# Patient Record
Sex: FEMALE | NOT HISPANIC OR LATINO | Employment: OTHER | URBAN - METROPOLITAN AREA
[De-identification: names, ages, dates, MRNs, and addresses within clinical notes are randomized per-mention and may not be internally consistent; named-entity substitution may affect disease eponyms.]

---

## 2024-06-15 ENCOUNTER — HOSPITAL ENCOUNTER (INPATIENT)
Facility: HOSPITAL | Age: 39
DRG: 644 | End: 2024-06-15
Attending: GENERAL PRACTICE | Admitting: INTERNAL MEDICINE
Payer: COMMERCIAL

## 2024-06-15 DIAGNOSIS — E87.1 HYPONATREMIA: Primary | ICD-10-CM

## 2024-06-15 LAB
ALBUMIN SERPL BCP-MCNC: 4.8 G/DL (ref 3.4–5)
ALP SERPL-CCNC: 85 U/L (ref 33–110)
ALT SERPL W P-5'-P-CCNC: 60 U/L (ref 7–45)
ANION GAP SERPL CALC-SCNC: 14 MMOL/L (ref 10–20)
APPEARANCE UR: CLEAR
AST SERPL W P-5'-P-CCNC: 56 U/L (ref 9–39)
B-HCG SERPL-ACNC: <2 MIU/ML
BASOPHILS # BLD AUTO: 0.04 X10*3/UL (ref 0–0.1)
BASOPHILS NFR BLD AUTO: 0.6 %
BILIRUB SERPL-MCNC: 1 MG/DL (ref 0–1.2)
BILIRUB UR STRIP.AUTO-MCNC: NEGATIVE MG/DL
BUN SERPL-MCNC: 8 MG/DL (ref 6–23)
CALCIUM SERPL-MCNC: 9.5 MG/DL (ref 8.6–10.3)
CHLORIDE SERPL-SCNC: 89 MMOL/L (ref 98–107)
CO2 SERPL-SCNC: 23 MMOL/L (ref 21–32)
COLOR UR: COLORLESS
CREAT SERPL-MCNC: 0.66 MG/DL (ref 0.5–1.05)
EGFRCR SERPLBLD CKD-EPI 2021: >90 ML/MIN/1.73M*2
EOSINOPHIL # BLD AUTO: 0.13 X10*3/UL (ref 0–0.7)
EOSINOPHIL NFR BLD AUTO: 1.8 %
ERYTHROCYTE [DISTWIDTH] IN BLOOD BY AUTOMATED COUNT: 11.7 % (ref 11.5–14.5)
FLUAV RNA RESP QL NAA+PROBE: NOT DETECTED
FLUBV RNA RESP QL NAA+PROBE: NOT DETECTED
GLUCOSE SERPL-MCNC: 99 MG/DL (ref 74–99)
GLUCOSE UR STRIP.AUTO-MCNC: NORMAL MG/DL
HCT VFR BLD AUTO: 39.5 % (ref 36–46)
HGB BLD-MCNC: 13.9 G/DL (ref 12–16)
IMM GRANULOCYTES # BLD AUTO: 0.03 X10*3/UL (ref 0–0.7)
IMM GRANULOCYTES NFR BLD AUTO: 0.4 % (ref 0–0.9)
KETONES UR STRIP.AUTO-MCNC: NEGATIVE MG/DL
LEUKOCYTE ESTERASE UR QL STRIP.AUTO: NEGATIVE
LYMPHOCYTES # BLD AUTO: 1.66 X10*3/UL (ref 1.2–4.8)
LYMPHOCYTES NFR BLD AUTO: 23.2 %
MCH RBC QN AUTO: 28 PG (ref 26–34)
MCHC RBC AUTO-ENTMCNC: 35.2 G/DL (ref 32–36)
MCV RBC AUTO: 80 FL (ref 80–100)
MONOCYTES # BLD AUTO: 0.46 X10*3/UL (ref 0.1–1)
MONOCYTES NFR BLD AUTO: 6.4 %
NEUTROPHILS # BLD AUTO: 4.84 X10*3/UL (ref 1.2–7.7)
NEUTROPHILS NFR BLD AUTO: 67.6 %
NITRITE UR QL STRIP.AUTO: NEGATIVE
NRBC BLD-RTO: 0 /100 WBCS (ref 0–0)
PH UR STRIP.AUTO: 6.5 [PH]
PLATELET # BLD AUTO: 348 X10*3/UL (ref 150–450)
POTASSIUM SERPL-SCNC: 4 MMOL/L (ref 3.5–5.3)
PROT SERPL-MCNC: 7.7 G/DL (ref 6.4–8.2)
PROT UR STRIP.AUTO-MCNC: NEGATIVE MG/DL
RBC # BLD AUTO: 4.96 X10*6/UL (ref 4–5.2)
RBC # UR STRIP.AUTO: NEGATIVE /UL
SARS-COV-2 RNA RESP QL NAA+PROBE: NOT DETECTED
SODIUM SERPL-SCNC: 122 MMOL/L (ref 136–145)
SP GR UR STRIP.AUTO: 1
UROBILINOGEN UR STRIP.AUTO-MCNC: NORMAL MG/DL
WBC # BLD AUTO: 7.2 X10*3/UL (ref 4.4–11.3)

## 2024-06-15 PROCEDURE — 80053 COMPREHEN METABOLIC PANEL: CPT | Performed by: GENERAL PRACTICE

## 2024-06-15 PROCEDURE — 2060000001 HC INTERMEDIATE ICU ROOM DAILY

## 2024-06-15 PROCEDURE — 36415 COLL VENOUS BLD VENIPUNCTURE: CPT | Performed by: GENERAL PRACTICE

## 2024-06-15 PROCEDURE — 84702 CHORIONIC GONADOTROPIN TEST: CPT | Performed by: GENERAL PRACTICE

## 2024-06-15 PROCEDURE — 85025 COMPLETE CBC W/AUTO DIFF WBC: CPT | Performed by: GENERAL PRACTICE

## 2024-06-15 PROCEDURE — 87636 SARSCOV2 & INF A&B AMP PRB: CPT | Performed by: GENERAL PRACTICE

## 2024-06-15 PROCEDURE — 2500000002 HC RX 250 W HCPCS SELF ADMINISTERED DRUGS (ALT 637 FOR MEDICARE OP, ALT 636 FOR OP/ED): Performed by: INTERNAL MEDICINE

## 2024-06-15 PROCEDURE — 2500000004 HC RX 250 GENERAL PHARMACY W/ HCPCS (ALT 636 FOR OP/ED): Performed by: GENERAL PRACTICE

## 2024-06-15 PROCEDURE — 96374 THER/PROPH/DIAG INJ IV PUSH: CPT

## 2024-06-15 PROCEDURE — 96361 HYDRATE IV INFUSION ADD-ON: CPT

## 2024-06-15 PROCEDURE — 99285 EMERGENCY DEPT VISIT HI MDM: CPT | Mod: 25

## 2024-06-15 PROCEDURE — 81003 URINALYSIS AUTO W/O SCOPE: CPT | Performed by: GENERAL PRACTICE

## 2024-06-15 PROCEDURE — 2500000004 HC RX 250 GENERAL PHARMACY W/ HCPCS (ALT 636 FOR OP/ED): Performed by: INTERNAL MEDICINE

## 2024-06-15 RX ORDER — PROCHLORPERAZINE 25 MG/1
25 SUPPOSITORY RECTAL EVERY 12 HOURS PRN
Status: DISCONTINUED | OUTPATIENT
Start: 2024-06-15 | End: 2024-06-17 | Stop reason: HOSPADM

## 2024-06-15 RX ORDER — DESMOPRESSIN ACETATE 0.1 MG/ML
10 SOLUTION NASAL 2 TIMES DAILY
Status: DISCONTINUED | OUTPATIENT
Start: 2024-06-15 | End: 2024-06-15

## 2024-06-15 RX ORDER — LEVOTHYROXINE SODIUM 75 UG/1
150 TABLET ORAL DAILY
Status: DISCONTINUED | OUTPATIENT
Start: 2024-06-16 | End: 2024-06-17 | Stop reason: HOSPADM

## 2024-06-15 RX ORDER — POLYETHYLENE GLYCOL 3350 17 G/17G
17 POWDER, FOR SOLUTION ORAL DAILY
Status: DISCONTINUED | OUTPATIENT
Start: 2024-06-15 | End: 2024-06-17 | Stop reason: HOSPADM

## 2024-06-15 RX ORDER — HYDROCORTISONE 5 MG/1
10 TABLET ORAL EVERY 12 HOURS SCHEDULED
Status: DISCONTINUED | OUTPATIENT
Start: 2024-06-15 | End: 2024-06-16

## 2024-06-15 RX ORDER — ONDANSETRON HYDROCHLORIDE 2 MG/ML
4 INJECTION, SOLUTION INTRAVENOUS EVERY 6 HOURS PRN
Status: DISCONTINUED | OUTPATIENT
Start: 2024-06-15 | End: 2024-06-17 | Stop reason: HOSPADM

## 2024-06-15 RX ORDER — PROCHLORPERAZINE EDISYLATE 5 MG/ML
10 INJECTION INTRAMUSCULAR; INTRAVENOUS EVERY 6 HOURS PRN
Status: DISCONTINUED | OUTPATIENT
Start: 2024-06-15 | End: 2024-06-17 | Stop reason: HOSPADM

## 2024-06-15 RX ORDER — PROCHLORPERAZINE MALEATE 10 MG
10 TABLET ORAL EVERY 6 HOURS PRN
Status: DISCONTINUED | OUTPATIENT
Start: 2024-06-15 | End: 2024-06-17 | Stop reason: HOSPADM

## 2024-06-15 RX ORDER — ONDANSETRON HYDROCHLORIDE 2 MG/ML
4 INJECTION, SOLUTION INTRAVENOUS ONCE
Status: COMPLETED | OUTPATIENT
Start: 2024-06-15 | End: 2024-06-15

## 2024-06-15 RX ORDER — SODIUM CHLORIDE 9 MG/ML
75 INJECTION, SOLUTION INTRAVENOUS CONTINUOUS
Status: DISCONTINUED | OUTPATIENT
Start: 2024-06-15 | End: 2024-06-16

## 2024-06-15 RX ADMIN — SODIUM CHLORIDE 1000 ML: 9 INJECTION, SOLUTION INTRAVENOUS at 16:05

## 2024-06-15 RX ADMIN — HYDROCORTISONE 10 MG: 5 TABLET ORAL at 20:38

## 2024-06-15 RX ADMIN — SODIUM CHLORIDE 1000 ML: 9 INJECTION, SOLUTION INTRAVENOUS at 14:24

## 2024-06-15 RX ADMIN — ONDANSETRON 4 MG: 2 INJECTION INTRAMUSCULAR; INTRAVENOUS at 14:24

## 2024-06-15 RX ADMIN — SODIUM CHLORIDE 75 ML/HR: 9 INJECTION, SOLUTION INTRAVENOUS at 20:37

## 2024-06-15 SDOH — SOCIAL STABILITY: SOCIAL INSECURITY: DO YOU FEEL ANYONE HAS EXPLOITED OR TAKEN ADVANTAGE OF YOU FINANCIALLY OR OF YOUR PERSONAL PROPERTY?: NO

## 2024-06-15 SDOH — SOCIAL STABILITY: SOCIAL INSECURITY: ABUSE: ADULT

## 2024-06-15 SDOH — SOCIAL STABILITY: SOCIAL INSECURITY: ARE THERE ANY APPARENT SIGNS OF INJURIES/BEHAVIORS THAT COULD BE RELATED TO ABUSE/NEGLECT?: NO

## 2024-06-15 SDOH — SOCIAL STABILITY: SOCIAL INSECURITY: HAS ANYONE EVER THREATENED TO HURT YOUR FAMILY OR YOUR PETS?: NO

## 2024-06-15 SDOH — SOCIAL STABILITY: SOCIAL INSECURITY: HAVE YOU HAD THOUGHTS OF HARMING ANYONE ELSE?: NO

## 2024-06-15 SDOH — SOCIAL STABILITY: SOCIAL INSECURITY: WERE YOU ABLE TO COMPLETE ALL THE BEHAVIORAL HEALTH SCREENINGS?: YES

## 2024-06-15 SDOH — SOCIAL STABILITY: SOCIAL INSECURITY: HAVE YOU HAD ANY THOUGHTS OF HARMING ANYONE ELSE?: NO

## 2024-06-15 SDOH — SOCIAL STABILITY: SOCIAL INSECURITY: DO YOU FEEL UNSAFE GOING BACK TO THE PLACE WHERE YOU ARE LIVING?: NO

## 2024-06-15 SDOH — SOCIAL STABILITY: SOCIAL INSECURITY: ARE YOU OR HAVE YOU BEEN THREATENED OR ABUSED PHYSICALLY, EMOTIONALLY, OR SEXUALLY BY ANYONE?: NO

## 2024-06-15 SDOH — SOCIAL STABILITY: SOCIAL INSECURITY: DOES ANYONE TRY TO KEEP YOU FROM HAVING/CONTACTING OTHER FRIENDS OR DOING THINGS OUTSIDE YOUR HOME?: NO

## 2024-06-15 ASSESSMENT — COLUMBIA-SUICIDE SEVERITY RATING SCALE - C-SSRS
1. IN THE PAST MONTH, HAVE YOU WISHED YOU WERE DEAD OR WISHED YOU COULD GO TO SLEEP AND NOT WAKE UP?: NO
6. HAVE YOU EVER DONE ANYTHING, STARTED TO DO ANYTHING, OR PREPARED TO DO ANYTHING TO END YOUR LIFE?: NO
2. HAVE YOU ACTUALLY HAD ANY THOUGHTS OF KILLING YOURSELF?: NO

## 2024-06-15 ASSESSMENT — LIFESTYLE VARIABLES
HOW OFTEN DO YOU HAVE A DRINK CONTAINING ALCOHOL: NEVER
AUDIT-C TOTAL SCORE: 0
SUBSTANCE_ABUSE_PAST_12_MONTHS: NO
HOW OFTEN DO YOU HAVE 6 OR MORE DRINKS ON ONE OCCASION: NEVER
PRESCIPTION_ABUSE_PAST_12_MONTHS: NO
HOW MANY STANDARD DRINKS CONTAINING ALCOHOL DO YOU HAVE ON A TYPICAL DAY: PATIENT DOES NOT DRINK
SKIP TO QUESTIONS 9-10: 1
AUDIT-C TOTAL SCORE: 0

## 2024-06-15 ASSESSMENT — COGNITIVE AND FUNCTIONAL STATUS - GENERAL
PATIENT BASELINE BEDBOUND: NO
MOBILITY SCORE: 24
DAILY ACTIVITIY SCORE: 24

## 2024-06-15 ASSESSMENT — ACTIVITIES OF DAILY LIVING (ADL)
ADEQUATE_TO_COMPLETE_ADL: YES
HEARING - LEFT EAR: FUNCTIONAL
PATIENT'S MEMORY ADEQUATE TO SAFELY COMPLETE DAILY ACTIVITIES?: YES
BATHING: INDEPENDENT
WALKS IN HOME: INDEPENDENT
FEEDING YOURSELF: INDEPENDENT
DRESSING YOURSELF: INDEPENDENT
TOILETING: INDEPENDENT
GROOMING: INDEPENDENT
JUDGMENT_ADEQUATE_SAFELY_COMPLETE_DAILY_ACTIVITIES: YES
HEARING - RIGHT EAR: FUNCTIONAL

## 2024-06-15 ASSESSMENT — PATIENT HEALTH QUESTIONNAIRE - PHQ9
2. FEELING DOWN, DEPRESSED OR HOPELESS: NOT AT ALL
SUM OF ALL RESPONSES TO PHQ9 QUESTIONS 1 & 2: 0
1. LITTLE INTEREST OR PLEASURE IN DOING THINGS: NOT AT ALL

## 2024-06-15 ASSESSMENT — PAIN SCALES - GENERAL
PAINLEVEL_OUTOF10: 0 - NO PAIN

## 2024-06-15 ASSESSMENT — PAIN - FUNCTIONAL ASSESSMENT
PAIN_FUNCTIONAL_ASSESSMENT: 0-10

## 2024-06-15 NOTE — ED TRIAGE NOTES
PT PRESENTS TO THE ED FOR VOMITING AND STATES THAT SHE HAS ADRENAL INSUFFIENCEY. PT ENDORSES THAT USUALLY WHEN THIS HAPPENS SHE GETS ADMITTED FOR HYPONATREMIA. PT DENIES FEVER OR CHILLS. PT DENIES CHEST OR SOB.

## 2024-06-15 NOTE — ED PROVIDER NOTES
HPI   Chief Complaint   Patient presents with    Vomiting    Nausea       HPI: 39-year-old female with a history of adrenal insufficiency and diabetes insipidus presents for nausea and vomiting.  Last evening she developed nausea and vomiting which continued through the night.  She is concerned that she may be hyponatremic.  She denies fever, chills, chest pain, abdominal pain, cough and shortness of breath.  No sick contacts or recent travel      Limitations to history: None  Independent Historians: Patient  External Records Reviewed: HIE, outpatient notes, inpatient notes  ------------------------------------------------------------------------------------------------------------------------------------------  ROS: a ten point review of systems was performed and was negative except as per HPI.  ------------------------------------------------------------------------------------------------------------------------------------------  PMH / PSH: as per HPI, otherwise reviewed in EMR  MEDS: as per HPI, otherwise reviewed in EMR  ALLERGIES: as per HPI, otherwise reviewed in EMR  SocH:  as per HPI, otherwise reviewed in EMR  FH:  as per HPI, otherwise reviewed in EMR  ------------------------------------------------------------------------------------------------------------------------------------------  Physical Exam:  VS: As documented in the triage note and EMR flowsheet from this visit was reviewed  General: Well appearing. No acute distress.   Eyes:  Extraocular movements grossly intact. No scleral icterus. No discharge  HEENT:  Normocephalic.  Atraumatic  Neck: Moves neck freely. No gross masses  CV: Regular rhythm. No murmurs, rubs or gallops   Resp: Clear to auscultation bilaterally. No respiratory distress.    GI: Soft, no masses, nontender. No rebound tenderness or guarding  MSK: Symmetric muscle bulk. No deformities. No lower extremity edema.    Skin: Warm, dry, intact.   Neuro: No focal deficits.  A&O x3.    Psych: Appropriate for situation  ------------------------------------------------------------------------------------------------------------------------------------------  Hospital Course / Medical Decision Making:  Independent Interpretations: N/A  EKG as interpreted by me: N/A    MDM: 39-year-old female with a history of adrenal insufficiency and diabetes insipidus presents for nausea and vomiting.  She was given Zofran.  Sodium found to be 122.  She is neurologically intact.  She was started on an infusion of sodium chloride and was admitted to the medicine service for further evaluation on ICU stepdown.    Discussion of Management with Other Providers:   I discussed the patient/results with: Emergency medicine team    Final diagnosis and disposition as below.    Results for orders placed or performed during the hospital encounter of 06/15/24  -CBC and Auto Differential:        Result                      Value             Ref Range           WBC                         7.2               4.4 - 11.3 x*       nRBC                        0.0               0.0 - 0.0 /1*       RBC                         4.96              4.00 - 5.20 *       Hemoglobin                  13.9              12.0 - 16.0 *       Hematocrit                  39.5              36.0 - 46.0 %       MCV                         80                80 - 100 fL         MCH                         28.0              26.0 - 34.0 *       MCHC                        35.2              32.0 - 36.0 *       RDW                         11.7              11.5 - 14.5 %       Platelets                   348               150 - 450 x1*       Neutrophils %               67.6              40.0 - 80.0 %       Immature Granulocytes *     0.4               0.0 - 0.9 %         Lymphocytes %               23.2              13.0 - 44.0 %       Monocytes %                 6.4               2.0 - 10.0 %        Eosinophils %               1.8               0.0 - 6.0 %          Basophils %                 0.6               0.0 - 2.0 %         Neutrophils Absolute        4.84              1.20 - 7.70 *       Immature Granulocytes *     0.03              0.00 - 0.70 *       Lymphocytes Absolute        1.66              1.20 - 4.80 *       Monocytes Absolute          0.46              0.10 - 1.00 *       Eosinophils Absolute        0.13              0.00 - 0.70 *       Basophils Absolute          0.04              0.00 - 0.10 *  -Sars-CoV-2 PCR:        Result                      Value             Ref Range           Coronavirus 2019, PCR       Not Detected      Not Detected   -Influenza A, and B PCR:        Result                      Value             Ref Range           Flu A Result                Not Detected      Not Detected        Flu B Result                Not Detected      Not Detected   No orders to display                            James Coma Scale Score: 15                     Patient History   No past medical history on file.  No past surgical history on file.  No family history on file.  Social History     Tobacco Use    Smoking status: Not on file    Smokeless tobacco: Not on file   Substance Use Topics    Alcohol use: Not on file    Drug use: Not on file       Physical Exam   ED Triage Vitals   Temperature Heart Rate Respirations BP   06/15/24 1328 06/15/24 1328 06/15/24 1328 06/15/24 1331   36.4 °C (97.5 °F) 86 18 (!) 144/99      Pulse Ox Temp Source Heart Rate Source Patient Position   06/15/24 1328 06/15/24 1328 -- --   98 % Temporal        BP Location FiO2 (%)     -- --             Physical Exam    ED Course & MDM   Diagnoses as of 06/16/24 2243   Hyponatremia       Medical Decision Making      Procedure  Procedures     Dave Hart, DO  06/16/24 2241

## 2024-06-16 VITALS
BODY MASS INDEX: 29.03 KG/M2 | WEIGHT: 196 LBS | TEMPERATURE: 97.1 F | DIASTOLIC BLOOD PRESSURE: 72 MMHG | OXYGEN SATURATION: 98 % | RESPIRATION RATE: 18 BRPM | HEIGHT: 69 IN | SYSTOLIC BLOOD PRESSURE: 116 MMHG | HEART RATE: 73 BPM

## 2024-06-16 LAB
ALBUMIN SERPL BCP-MCNC: 4.1 G/DL (ref 3.4–5)
ANION GAP SERPL CALC-SCNC: 10 MMOL/L (ref 10–20)
ANION GAP SERPL CALC-SCNC: 11 MMOL/L (ref 10–20)
BUN SERPL-MCNC: 10 MG/DL (ref 6–23)
BUN SERPL-MCNC: 13 MG/DL (ref 6–23)
CALCIUM SERPL-MCNC: 8.6 MG/DL (ref 8.6–10.3)
CALCIUM SERPL-MCNC: 9.3 MG/DL (ref 8.6–10.3)
CHLORIDE SERPL-SCNC: 104 MMOL/L (ref 98–107)
CHLORIDE SERPL-SCNC: 99 MMOL/L (ref 98–107)
CO2 SERPL-SCNC: 25 MMOL/L (ref 21–32)
CO2 SERPL-SCNC: 25 MMOL/L (ref 21–32)
CREAT SERPL-MCNC: 0.82 MG/DL (ref 0.5–1.05)
CREAT SERPL-MCNC: 0.85 MG/DL (ref 0.5–1.05)
CREAT UR-MCNC: 72 MG/DL (ref 20–320)
EGFRCR SERPLBLD CKD-EPI 2021: 90 ML/MIN/1.73M*2
EGFRCR SERPLBLD CKD-EPI 2021: >90 ML/MIN/1.73M*2
ERYTHROCYTE [DISTWIDTH] IN BLOOD BY AUTOMATED COUNT: 12.3 % (ref 11.5–14.5)
GLUCOSE SERPL-MCNC: 103 MG/DL (ref 74–99)
GLUCOSE SERPL-MCNC: 81 MG/DL (ref 74–99)
HCT VFR BLD AUTO: 38.7 % (ref 36–46)
HGB BLD-MCNC: 13.2 G/DL (ref 12–16)
HOLD SPECIMEN: NORMAL
MCH RBC QN AUTO: 28.1 PG (ref 26–34)
MCHC RBC AUTO-ENTMCNC: 34.1 G/DL (ref 32–36)
MCV RBC AUTO: 83 FL (ref 80–100)
NRBC BLD-RTO: 0 /100 WBCS (ref 0–0)
OSMOLALITY UR: 513 MOSM/KG (ref 200–1200)
PHOSPHATE SERPL-MCNC: 3.3 MG/DL (ref 2.5–4.9)
PLATELET # BLD AUTO: 321 X10*3/UL (ref 150–450)
POTASSIUM SERPL-SCNC: 4.2 MMOL/L (ref 3.5–5.3)
POTASSIUM SERPL-SCNC: 4.2 MMOL/L (ref 3.5–5.3)
RBC # BLD AUTO: 4.69 X10*6/UL (ref 4–5.2)
SODIUM SERPL-SCNC: 131 MMOL/L (ref 136–145)
SODIUM SERPL-SCNC: 135 MMOL/L (ref 136–145)
WBC # BLD AUTO: 7.5 X10*3/UL (ref 4.4–11.3)

## 2024-06-16 PROCEDURE — 2500000004 HC RX 250 GENERAL PHARMACY W/ HCPCS (ALT 636 FOR OP/ED): Performed by: INTERNAL MEDICINE

## 2024-06-16 PROCEDURE — 83935 ASSAY OF URINE OSMOLALITY: CPT | Mod: AHULAB | Performed by: INTERNAL MEDICINE

## 2024-06-16 PROCEDURE — 36415 COLL VENOUS BLD VENIPUNCTURE: CPT | Performed by: INTERNAL MEDICINE

## 2024-06-16 PROCEDURE — 99221 1ST HOSP IP/OBS SF/LOW 40: CPT | Performed by: INTERNAL MEDICINE

## 2024-06-16 PROCEDURE — 84100 ASSAY OF PHOSPHORUS: CPT | Performed by: INTERNAL MEDICINE

## 2024-06-16 PROCEDURE — 82570 ASSAY OF URINE CREATININE: CPT | Performed by: INTERNAL MEDICINE

## 2024-06-16 PROCEDURE — 2500000001 HC RX 250 WO HCPCS SELF ADMINISTERED DRUGS (ALT 637 FOR MEDICARE OP): Performed by: INTERNAL MEDICINE

## 2024-06-16 PROCEDURE — 2500000002 HC RX 250 W HCPCS SELF ADMINISTERED DRUGS (ALT 637 FOR MEDICARE OP, ALT 636 FOR OP/ED): Performed by: INTERNAL MEDICINE

## 2024-06-16 PROCEDURE — 85027 COMPLETE CBC AUTOMATED: CPT | Performed by: INTERNAL MEDICINE

## 2024-06-16 PROCEDURE — 2060000001 HC INTERMEDIATE ICU ROOM DAILY

## 2024-06-16 PROCEDURE — 82374 ASSAY BLOOD CARBON DIOXIDE: CPT | Performed by: INTERNAL MEDICINE

## 2024-06-16 RX ORDER — DESMOPRESSIN ACETATE 0.1 MG/ML
10 SOLUTION NASAL NIGHTLY
Status: DISCONTINUED | OUTPATIENT
Start: 2024-06-16 | End: 2024-06-17 | Stop reason: HOSPADM

## 2024-06-16 RX ORDER — ACETAMINOPHEN 650 MG/1
650 SUPPOSITORY RECTAL EVERY 4 HOURS PRN
Status: DISCONTINUED | OUTPATIENT
Start: 2024-06-16 | End: 2024-06-17 | Stop reason: HOSPADM

## 2024-06-16 RX ORDER — ACETAMINOPHEN 325 MG/1
650 TABLET ORAL EVERY 4 HOURS PRN
Status: DISCONTINUED | OUTPATIENT
Start: 2024-06-16 | End: 2024-06-17 | Stop reason: HOSPADM

## 2024-06-16 RX ORDER — HYDROCORTISONE 5 MG/1
10 TABLET ORAL EVERY 24 HOURS
Status: DISCONTINUED | OUTPATIENT
Start: 2024-06-16 | End: 2024-06-17 | Stop reason: HOSPADM

## 2024-06-16 RX ORDER — ACETAMINOPHEN 160 MG/5ML
650 SOLUTION ORAL EVERY 4 HOURS PRN
Status: DISCONTINUED | OUTPATIENT
Start: 2024-06-16 | End: 2024-06-17 | Stop reason: HOSPADM

## 2024-06-16 RX ORDER — DESMOPRESSIN ACETATE 4 UG/ML
2 INJECTION, SOLUTION INTRAVENOUS; SUBCUTANEOUS ONCE
Status: COMPLETED | OUTPATIENT
Start: 2024-06-16 | End: 2024-06-16

## 2024-06-16 RX ORDER — HYDROCORTISONE 5 MG/1
20 TABLET ORAL EVERY 24 HOURS
Status: DISCONTINUED | OUTPATIENT
Start: 2024-06-17 | End: 2024-06-17 | Stop reason: HOSPADM

## 2024-06-16 RX ORDER — DESMOPRESSIN ACETATE 0.1 MG/ML
10 SOLUTION NASAL NIGHTLY
Status: DISCONTINUED | OUTPATIENT
Start: 2024-06-16 | End: 2024-06-16 | Stop reason: CLARIF

## 2024-06-16 RX ADMIN — ACETAMINOPHEN 650 MG: 325 TABLET ORAL at 12:31

## 2024-06-16 RX ADMIN — HYDROCORTISONE 10 MG: 5 TABLET ORAL at 17:05

## 2024-06-16 RX ADMIN — LEVOTHYROXINE SODIUM 150 MCG: 75 TABLET ORAL at 05:47

## 2024-06-16 RX ADMIN — ACETAMINOPHEN 650 MG: 325 TABLET ORAL at 06:15

## 2024-06-16 RX ADMIN — SODIUM CHLORIDE 75 ML/HR: 9 INJECTION, SOLUTION INTRAVENOUS at 05:49

## 2024-06-16 RX ADMIN — ACETAMINOPHEN 650 MG: 325 TABLET ORAL at 20:58

## 2024-06-16 RX ADMIN — DESMOPRESSIN ACETATE 2 MCG: 4 SOLUTION INTRAVENOUS at 12:31

## 2024-06-16 RX ADMIN — HYDROCORTISONE 10 MG: 5 TABLET ORAL at 09:13

## 2024-06-16 ASSESSMENT — COGNITIVE AND FUNCTIONAL STATUS - GENERAL
MOBILITY SCORE: 24
DAILY ACTIVITIY SCORE: 24
MOBILITY SCORE: 24
DAILY ACTIVITIY SCORE: 24

## 2024-06-16 ASSESSMENT — PAIN SCALES - GENERAL
PAINLEVEL_OUTOF10: 3
PAINLEVEL_OUTOF10: 0 - NO PAIN
PAINLEVEL_OUTOF10: 3
PAINLEVEL_OUTOF10: 0 - NO PAIN

## 2024-06-16 ASSESSMENT — PAIN - FUNCTIONAL ASSESSMENT
PAIN_FUNCTIONAL_ASSESSMENT: 0-10

## 2024-06-16 ASSESSMENT — ENCOUNTER SYMPTOMS
DIARRHEA: 1
BACK PAIN: 0
ABDOMINAL PAIN: 0
DIAPHORESIS: 0
ENDOCRINE COMMENTS: AS ABOVE
CHEST TIGHTNESS: 0
AGITATION: 0
FATIGUE: 0
APNEA: 0
APPETITE CHANGE: 1
ABDOMINAL DISTENTION: 0
ACTIVITY CHANGE: 0
FACIAL ASYMMETRY: 0
DIFFICULTY URINATING: 0
EYE DISCHARGE: 0
NAUSEA: 1
DIZZINESS: 0
ARTHRALGIAS: 1
VOMITING: 1

## 2024-06-16 ASSESSMENT — PAIN DESCRIPTION - LOCATION: LOCATION: HEAD

## 2024-06-16 NOTE — CARE PLAN
The patient's goals for the shift include remains HDS    The clinical goals for the shift include patient will remain hemodynamically stable throughout shift

## 2024-06-16 NOTE — CONSULTS
"Inpatient consult to Endocrinology  Consult performed by: Marcel Romero MD  Consult ordered by: Vinny Pierce MD      Reason For Consult  Pituitary    History Of Present Illness  Kerri Torres is a 39 y.o. female presenting with Hyponatremia.     History of postoperative hypopituitarism since age 7.   History of craniopharyngioma.    Patient presented symptomatic of hyponatremia, which she recognized.    Hormone replacements:  Hydrocortisone 20 mg QAM, 10 mg QPM  Levothyroxine 150 mcg/day  DDAVP 10 mcg one nasal spray per night  Genotropin 0.2 mg/day  Xulane patch      Past Medical History  Craniopharyngioma  Irritable bowel syndrome    Surgical History  Excision of craniopharyngioma  Lumbar spine surgery  [place] tooth surgery  TMJ surgery     Social History  She has no history on file for tobacco use, alcohol use, and drug use.    Family History  Breast cancer     Allergies  Shellfish containing products and Metoclopramide hcl    Review of Systems   Gastrointestinal:  Positive for diarrhea (not current), nausea (not current) and vomiting (not current).   Endocrine:        As above        Physical Exam  Constitutional:       General: She is not in acute distress.  HENT:      Head: Normocephalic.   Eyes:      Extraocular Movements: Extraocular movements intact.   Cardiovascular:      Pulses:           Radial pulses are 2+ on the right side and 2+ on the left side.   Abdominal:      Tenderness: There is no abdominal tenderness.   Musculoskeletal:      Right lower leg: No edema.      Left lower leg: No edema.   Neurological:      Mental Status: She is alert.      Motor: No tremor.   Psychiatric:         Mood and Affect: Affect normal.          Last Recorded Vitals  Blood pressure 103/70, pulse 81, temperature 36.6 °C (97.8 °F), temperature source Temporal, resp. rate 18, height 1.753 m (5' 9\"), weight 88.9 kg (196 lb), last menstrual period 05/27/2024, SpO2 96%.    Relevant Results   Latest Reference " Range & Units 06/15/24 13:54 06/16/24 06:11 06/16/24 14:18   GLUCOSE 74 - 99 mg/dL 99 81 103 (H)   SODIUM 136 - 145 mmol/L 122 (L) 135 (L) 131 (L)   POTASSIUM 3.5 - 5.3 mmol/L 4.0 4.2 4.2   CHLORIDE 98 - 107 mmol/L 89 (L) 104 99   Bicarbonate 21 - 32 mmol/L 23 25 25   Anion Gap 10 - 20 mmol/L 14 10 11   Blood Urea Nitrogen 6 - 23 mg/dL 8 10 13   Creatinine 0.50 - 1.05 mg/dL 0.66 0.82 0.85   EGFR >60 mL/min/1.73m*2 >90 >90 90   Calcium 8.6 - 10.3 mg/dL 9.5 9.3 8.6   PHOSPHORUS 2.5 - 4.9 mg/dL   3.3   Albumin 3.4 - 5.0 g/dL 4.8  4.1       Assessment/Plan   Principal Problem:    Hyponatremia      HYPOPITUITARISM  Hyponatremia resolved with intervention  Patient dosed with IV DDAVP today    RECOMMENDATIONS  Continue maintenance hydrocortisone, levothyroxine  Hold DDAVP spray tonight, resume tomorrow  Resume Genotropin at discharge  Xulane is continuous by patch      Marcel Romero MD

## 2024-06-16 NOTE — CONSULTS
Reason For Consult  Hyponatremia    History Of Present Illness  Kerri Torres is a 39 y.o. female with a history of adrenal insufficiency, diabetes insipidus, has had a pituitary tumor removal, takes DDAVP nasal spray as well as hydrocortisone, levothyroxine.  She was suffering nausea, vomiting, diarrhea.  She thought her sodium may be low, came to the emergency department with sodium was 122 mEq/L.  She was given normal saline, sodium has come up too quickly.  She is followed by endocrinology at the [facility].  She has panhypopituitarism since resection of a craniopharyngioma at 7 years old, has been on the thyroid replacement, the Cortef, DDAVP at bedtime, somatropin and birth control pills for estrogen replacement.    She has had a right wrist fracture, recurrent sinus infections, a left lower lobe pneumonia in 2015.  [place] teeth surgery in 2005, had a TMJ surgery in 2017.    Mother with breast cancer at 59 years old, father with aortic stenosis, strokes in the family.    No tobacco, only rare alcohol, no other drugs.  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    On review of systems occasional anti-inflammatories.  No hematuria, foamy urine, no blood transfusions or hepatitis.  All other review of systems are negative.      Past Medical History  She has no past medical history on file.    Surgical History  She has no past surgical history on file.    Allergies  Shellfish containing products and Metoclopramide hcl      Current Facility-Administered Medications:     acetaminophen (Tylenol) tablet 650 mg, 650 mg, oral, q4h PRN, 650 mg at 06/16/24 0615 **OR** acetaminophen (Tylenol) oral liquid 650 mg, 650 mg, nasogastric tube, q4h PRN **OR** acetaminophen (Tylenol) suppository 650 mg, 650 mg, rectal, q4h PRN, Te Moreland DO    hydrocortisone (Cortef) tablet 10 mg, 10 mg, oral, q12h [facility], Vinny Pierce MD, 10 mg at 06/16/24 0913    levothyroxine (Synthroid, Levoxyl) tablet 150 mcg,  150 mcg, oral, Daily, Vinny Pierce MD, 150 mcg at 06/16/24 0547    ondansetron (Zofran) injection 4 mg, 4 mg, intravenous, q6h PRN, Vinny Pierce MD    polyethylene glycol (Glycolax, Miralax) packet 17 g, 17 g, oral, Daily, Vinny Pierce MD    prochlorperazine (Compazine) tablet 10 mg, 10 mg, oral, q6h PRN **OR** prochlorperazine (Compazine) injection 10 mg, 10 mg, intravenous, q6h PRN **OR** prochlorperazine (Compazine) suppository 25 mg, 25 mg, rectal, q12h PRN, Vinny Pierce MD     Medications Discontinued During This Encounter   Medication Reason    desmopressin (DDAVP) 10 mcg/spray (0.1 mL) nasal spray 10 mcg     sodium chloride 0.9% infusion          Social History  She has no history on file for tobacco use, alcohol use, and drug use.    Family History  No family history on file.     Review of Systems   Constitutional: Negative.  Negative for diaphoresis, fatigue and fever.   HENT: Negative.  Negative for hearing loss, sore throat and tinnitus.    Eyes: Negative.  Negative for redness and visual disturbance.   Respiratory:  Negative for chest tightness and shortness of breath.    Cardiovascular:  Negative for chest pain, palpitations and leg swelling.   Gastrointestinal:  Negative for abdominal pain, blood in stool, diarrhea, nausea and vomiting.   Endocrine: Negative.    Genitourinary:  Negative for difficulty urinating, dysuria, frequency, hematuria and urgency.   Musculoskeletal:  Negative for arthralgias and joint swelling.   Skin:  Negative for rash.   Allergic/Immunologic: Negative.  Negative for immunocompromised state.   Neurological:  Negative for tremors, seizures, weakness and headaches.   Psychiatric/Behavioral:  Negative for behavioral problems, confusion and hallucinations.    All other systems reviewed and are negative.      Physical Exam  Constitutional:       Appearance: Normal appearance.   HENT:      Mouth/Throat:      Mouth: Mucous membranes are  "moist.   Eyes:      Extraocular Movements: Extraocular movements intact.      Pupils: Pupils are equal, round, and reactive to light.   Cardiovascular:      Rate and Rhythm: Regular rhythm.      Heart sounds: S1 normal and S2 normal.   Pulmonary:      Breath sounds: Normal breath sounds.   Abdominal:      Comments: Soft, NT/ND, no masses, normal bowel sounds   Genitourinary:     Comments: No hutton  Musculoskeletal:      Right lower leg: No edema.      Left lower leg: No edema.   Skin:     General: Skin is warm and dry.   Neurological:      General: No focal deficit present.      Mental Status: She is alert and oriented to person, place, and time.   Psychiatric:         Behavior: Behavior normal.      Last Recorded Vitals  Blood pressure 115/76, pulse 81, temperature 36.2 °C (97.1 °F), temperature source Temporal, resp. rate 18, height 1.753 m (5' 9\"), weight 88.9 kg (196 lb), last menstrual period 05/27/2024, SpO2 95%.    Last 24 hour lab Results  Results for orders placed or performed during the hospital encounter of 06/15/24 (from the past 24 hour(s))   CBC and Auto Differential   Result Value Ref Range    WBC 7.2 4.4 - 11.3 x10*3/uL    nRBC 0.0 0.0 - 0.0 /100 WBCs    RBC 4.96 4.00 - 5.20 x10*6/uL    Hemoglobin 13.9 12.0 - 16.0 g/dL    Hematocrit 39.5 36.0 - 46.0 %    MCV 80 80 - 100 fL    MCH 28.0 26.0 - 34.0 pg    MCHC 35.2 32.0 - 36.0 g/dL    RDW 11.7 11.5 - 14.5 %    Platelets 348 150 - 450 x10*3/uL    Neutrophils % 67.6 40.0 - 80.0 %    Immature Granulocytes %, Automated 0.4 0.0 - 0.9 %    Lymphocytes % 23.2 13.0 - 44.0 %    Monocytes % 6.4 2.0 - 10.0 %    Eosinophils % 1.8 0.0 - 6.0 %    Basophils % 0.6 0.0 - 2.0 %    Neutrophils Absolute 4.84 1.20 - 7.70 x10*3/uL    Immature Granulocytes Absolute, Automated 0.03 0.00 - 0.70 x10*3/uL    Lymphocytes Absolute 1.66 1.20 - 4.80 x10*3/uL    Monocytes Absolute 0.46 0.10 - 1.00 x10*3/uL    Eosinophils Absolute 0.13 0.00 - 0.70 x10*3/uL    Basophils Absolute 0.04 " 0.00 - 0.10 x10*3/uL   Comprehensive metabolic panel   Result Value Ref Range    Glucose 99 74 - 99 mg/dL    Sodium 122 (L) 136 - 145 mmol/L    Potassium 4.0 3.5 - 5.3 mmol/L    Chloride 89 (L) 98 - 107 mmol/L    Bicarbonate 23 21 - 32 mmol/L    Anion Gap 14 10 - 20 mmol/L    Urea Nitrogen 8 6 - 23 mg/dL    Creatinine 0.66 0.50 - 1.05 mg/dL    eGFR >90 >60 mL/min/1.73m*2    Calcium 9.5 8.6 - 10.3 mg/dL    Albumin 4.8 3.4 - 5.0 g/dL    Alkaline Phosphatase 85 33 - 110 U/L    Total Protein 7.7 6.4 - 8.2 g/dL    AST 56 (H) 9 - 39 U/L    Bilirubin, Total 1.0 0.0 - 1.2 mg/dL    ALT 60 (H) 7 - 45 U/L   Human Chorionic Gonadotropin, Serum Quantitative   Result Value Ref Range    HCG, Beta-Quantitative <2 <5 mIU/mL   Sars-CoV-2 PCR   Result Value Ref Range    Coronavirus 2019, PCR Not Detected Not Detected   Influenza A, and B PCR   Result Value Ref Range    Flu A Result Not Detected Not Detected    Flu B Result Not Detected Not Detected   Urinalysis with Reflex Culture and Microscopic   Result Value Ref Range    Color, Urine Colorless (N) Light-Yellow, Yellow, Dark-Yellow    Appearance, Urine Clear Clear    Specific Gravity, Urine 1.004 (N) 1.005 - 1.035    pH, Urine 6.5 5.0, 5.5, 6.0, 6.5, 7.0, 7.5, 8.0    Protein, Urine NEGATIVE NEGATIVE, 10 (TRACE), 20 (TRACE) mg/dL    Glucose, Urine Normal Normal mg/dL    Blood, Urine NEGATIVE NEGATIVE    Ketones, Urine NEGATIVE NEGATIVE mg/dL    Bilirubin, Urine NEGATIVE NEGATIVE    Urobilinogen, Urine Normal Normal mg/dL    Nitrite, Urine NEGATIVE NEGATIVE    Leukocyte Esterase, Urine NEGATIVE NEGATIVE   Extra Urine Gray Tube   Result Value Ref Range    Extra Tube Hold for add-ons.    CBC   Result Value Ref Range    WBC 7.5 4.4 - 11.3 x10*3/uL    nRBC 0.0 0.0 - 0.0 /100 WBCs    RBC 4.69 4.00 - 5.20 x10*6/uL    Hemoglobin 13.2 12.0 - 16.0 g/dL    Hematocrit 38.7 36.0 - 46.0 %    MCV 83 80 - 100 fL    MCH 28.1 26.0 - 34.0 pg    MCHC 34.1 32.0 - 36.0 g/dL    RDW 12.3 11.5 - 14.5 %     Platelets 321 150 - 450 x10*3/uL   Basic Metabolic Panel   Result Value Ref Range    Glucose 81 74 - 99 mg/dL    Sodium 135 (L) 136 - 145 mmol/L    Potassium 4.2 3.5 - 5.3 mmol/L    Chloride 104 98 - 107 mmol/L    Bicarbonate 25 21 - 32 mmol/L    Anion Gap 10 10 - 20 mmol/L    Urea Nitrogen 10 6 - 23 mg/dL    Creatinine 0.82 0.50 - 1.05 mg/dL    eGFR >90 >60 mL/min/1.73m*2    Calcium 9.3 8.6 - 10.3 mg/dL        Imaging results   No results found.      Assessment/Plan   Kerri Torres is a 39 y.o. female with a history of adrenal insufficiency, diabetes insipidus, has had a pituitary tumor removal, takes DDAVP nasal spray as well as hydrocortisone, levothyroxine.  She was suffering nausea, vomiting, diarrhea.  She thought her sodium may be low, came to the emergency department with sodium was 122 mEq/L.  She was given normal saline, sodium has come up too quickly.  She is followed by [facility]ology at the [facility].  She has panhypopituitarism since resection of a craniopharyngioma at 7 years old, has been on the thyroid replacement, the Cortef, DDAVP at bedtime, somatropin and birth control pills for estrogen replacement.    She did not receive her DDAVP last night, she is losing free water.  The rate of rise of her serum sodium is too brisk.  I will give her 2 mcg of DDAVP IV, I will order her nasal spray for tonight.  I will give her water now, it is at the bedside.  I will recheck his serum sodium in 3 hours.  I am hoping to bring it down toward 130 mEq/L.  She is normally on estrogens and growth hormone.  We may need to ask endocrinology regarding that dosing, whether that has to be done now.    60 minutes in the care of Ms. Torres.    Ayaz Villalpando MD

## 2024-06-16 NOTE — H&P
History Of Present Illness  Kerri Torres is a 39 y.o. 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  She is feeling much better she does not have any nausea vomiting diarrhea abdominal pain no fever no chills.  .     Past Medical History  She has no past medical history on file.    Surgical History  She has no past surgical history on file.     Social History  She has no history on file for tobacco use, alcohol use, and drug use.    Family History  No family history on file.     Allergies  Shellfish containing products and Metoclopramide hcl    Review of Systems   Constitutional:  Positive for appetite change. Negative for activity change, diaphoresis and fatigue.   HENT:  Negative for congestion and dental problem.    Eyes:  Negative for discharge.   Respiratory:  Negative for apnea and chest tightness.    Gastrointestinal:  Positive for diarrhea, nausea and vomiting. Negative for abdominal distention and abdominal pain.   Endocrine: Positive for cold intolerance.   Genitourinary:  Negative for difficulty urinating and dyspareunia.   Musculoskeletal:  Positive for arthralgias. Negative for back pain.   Neurological:  Negative for dizziness and facial asymmetry.   Psychiatric/Behavioral:  Negative for agitation and behavioral problems.         Physical Exam  Constitutional:       Appearance: Normal appearance.   HENT:      Head: Normocephalic and atraumatic.   Eyes:      Pupils: Pupils are equal, round, and reactive to light.   Cardiovascular:  "     Rate and Rhythm: Normal rate and regular rhythm.   Pulmonary:      Effort: Pulmonary effort is normal.      Breath sounds: Normal breath sounds.   Abdominal:      General: There is no distension.      Palpations: There is no mass.   Musculoskeletal:         General: No swelling or tenderness.      Cervical back: Normal range of motion and neck supple.   Skin:     General: Skin is warm.   Neurological:      General: No focal deficit present.      Mental Status: She is alert and oriented to person, place, and time.   Psychiatric:         Mood and Affect: Mood normal.          Last Recorded Vitals  Blood pressure 115/76, pulse 81, temperature 36.2 °C (97.1 °F), temperature source Temporal, resp. rate 18, height 1.753 m (5' 9\"), weight 88.9 kg (196 lb), last menstrual period 05/27/2024, SpO2 95%.    Relevant Results  Results for orders placed or performed during the hospital encounter of 06/15/24 (from the past 96 hour(s))   CBC and Auto Differential   Result Value Ref Range    WBC 7.2 4.4 - 11.3 x10*3/uL    nRBC 0.0 0.0 - 0.0 /100 WBCs    RBC 4.96 4.00 - 5.20 x10*6/uL    Hemoglobin 13.9 12.0 - 16.0 g/dL    Hematocrit 39.5 36.0 - 46.0 %    MCV 80 80 - 100 fL    MCH 28.0 26.0 - 34.0 pg    MCHC 35.2 32.0 - 36.0 g/dL    RDW 11.7 11.5 - 14.5 %    Platelets 348 150 - 450 x10*3/uL    Neutrophils % 67.6 40.0 - 80.0 %    Immature Granulocytes %, Automated 0.4 0.0 - 0.9 %    Lymphocytes % 23.2 13.0 - 44.0 %    Monocytes % 6.4 2.0 - 10.0 %    Eosinophils % 1.8 0.0 - 6.0 %    Basophils % 0.6 0.0 - 2.0 %    Neutrophils Absolute 4.84 1.20 - 7.70 x10*3/uL    Immature Granulocytes Absolute, Automated 0.03 0.00 - 0.70 x10*3/uL    Lymphocytes Absolute 1.66 1.20 - 4.80 x10*3/uL    Monocytes Absolute 0.46 0.10 - 1.00 x10*3/uL    Eosinophils Absolute 0.13 0.00 - 0.70 x10*3/uL    Basophils Absolute 0.04 0.00 - 0.10 x10*3/uL   Comprehensive metabolic panel   Result Value Ref Range    Glucose 99 74 - 99 mg/dL    Sodium 122 (L) 136 - 145 " mmol/L    Potassium 4.0 3.5 - 5.3 mmol/L    Chloride 89 (L) 98 - 107 mmol/L    Bicarbonate 23 21 - 32 mmol/L    Anion Gap 14 10 - 20 mmol/L    Urea Nitrogen 8 6 - 23 mg/dL    Creatinine 0.66 0.50 - 1.05 mg/dL    eGFR >90 >60 mL/min/1.73m*2    Calcium 9.5 8.6 - 10.3 mg/dL    Albumin 4.8 3.4 - 5.0 g/dL    Alkaline Phosphatase 85 33 - 110 U/L    Total Protein 7.7 6.4 - 8.2 g/dL    AST 56 (H) 9 - 39 U/L    Bilirubin, Total 1.0 0.0 - 1.2 mg/dL    ALT 60 (H) 7 - 45 U/L   Human Chorionic Gonadotropin, Serum Quantitative   Result Value Ref Range    HCG, Beta-Quantitative <2 <5 mIU/mL   Sars-CoV-2 PCR   Result Value Ref Range    Coronavirus 2019, PCR Not Detected Not Detected   Influenza A, and B PCR   Result Value Ref Range    Flu A Result Not Detected Not Detected    Flu B Result Not Detected Not Detected   Urinalysis with Reflex Culture and Microscopic   Result Value Ref Range    Color, Urine Colorless (N) Light-Yellow, Yellow, Dark-Yellow    Appearance, Urine Clear Clear    Specific Gravity, Urine 1.004 (N) 1.005 - 1.035    pH, Urine 6.5 5.0, 5.5, 6.0, 6.5, 7.0, 7.5, 8.0    Protein, Urine NEGATIVE NEGATIVE, 10 (TRACE), 20 (TRACE) mg/dL    Glucose, Urine Normal Normal mg/dL    Blood, Urine NEGATIVE NEGATIVE    Ketones, Urine NEGATIVE NEGATIVE mg/dL    Bilirubin, Urine NEGATIVE NEGATIVE    Urobilinogen, Urine Normal Normal mg/dL    Nitrite, Urine NEGATIVE NEGATIVE    Leukocyte Esterase, Urine NEGATIVE NEGATIVE   Extra Urine Gray Tube   Result Value Ref Range    Extra Tube Hold for add-ons.    CBC   Result Value Ref Range    WBC 7.5 4.4 - 11.3 x10*3/uL    nRBC 0.0 0.0 - 0.0 /100 WBCs    RBC 4.69 4.00 - 5.20 x10*6/uL    Hemoglobin 13.2 12.0 - 16.0 g/dL    Hematocrit 38.7 36.0 - 46.0 %    MCV 83 80 - 100 fL    MCH 28.1 26.0 - 34.0 pg    MCHC 34.1 32.0 - 36.0 g/dL    RDW 12.3 11.5 - 14.5 %    Platelets 321 150 - 450 x10*3/uL   Basic Metabolic Panel   Result Value Ref Range    Glucose 81 74 - 99 mg/dL    Sodium 135 (L) 136 -  145 mmol/L    Potassium 4.2 3.5 - 5.3 mmol/L    Chloride 104 98 - 107 mmol/L    Bicarbonate 25 21 - 32 mmol/L    Anion Gap 10 10 - 20 mmol/L    Urea Nitrogen 10 6 - 23 mg/dL    Creatinine 0.82 0.50 - 1.05 mg/dL    eGFR >90 >60 mL/min/1.73m*2    Calcium 9.3 8.6 - 10.3 mg/dL      No results found.    Medications  hydrocortisone, 10 mg, oral, q12h ARASELI  levothyroxine, 150 mcg, oral, Daily  polyethylene glycol, 17 g, oral, Daily       PRN medications: acetaminophen **OR** acetaminophen **OR** acetaminophen, ondansetron, prochlorperazine **OR** prochlorperazine **OR** prochlorperazine     Assessment/Plan   Principal Problem:    Hyponatremia      39-year-old  female who is known to have history of pituitary tumor removal with replacement with hydrocortisone and DDAVP, levothyroxine, with adrenal insufficiency hypothyroidism and diabetes insipidus, comes to the emergency department after nausea vomiting diarrhea though that got resolved now she was suspecting she is having low sodium and she comes to the emergency is being admitted with hyponatremia.  Hyponatremia 122 sodium today is 135 but it looks like it went up too fast we will get the renal opinion in the meantime she is able to tolerate her medications good.  Asked about other endocrinology issues she is stable for now.  DVT prophylaxis         I spent 60 minutes in the professional and overall care of this patient.    Vinny Pierce MD

## 2024-06-17 VITALS
BODY MASS INDEX: 29.03 KG/M2 | HEIGHT: 69 IN | HEART RATE: 71 BPM | WEIGHT: 196 LBS | DIASTOLIC BLOOD PRESSURE: 87 MMHG | OXYGEN SATURATION: 98 % | TEMPERATURE: 97.1 F | SYSTOLIC BLOOD PRESSURE: 129 MMHG | RESPIRATION RATE: 16 BRPM

## 2024-06-17 LAB
ALBUMIN SERPL BCP-MCNC: 4.3 G/DL (ref 3.4–5)
ANION GAP SERPL CALC-SCNC: 12 MMOL/L (ref 10–20)
BUN SERPL-MCNC: 14 MG/DL (ref 6–23)
CALCIUM SERPL-MCNC: 9.3 MG/DL (ref 8.6–10.3)
CHLORIDE SERPL-SCNC: 95 MMOL/L (ref 98–107)
CO2 SERPL-SCNC: 25 MMOL/L (ref 21–32)
CREAT SERPL-MCNC: 0.69 MG/DL (ref 0.5–1.05)
EGFRCR SERPLBLD CKD-EPI 2021: >90 ML/MIN/1.73M*2
ERYTHROCYTE [DISTWIDTH] IN BLOOD BY AUTOMATED COUNT: 11.7 % (ref 11.5–14.5)
GLUCOSE SERPL-MCNC: 86 MG/DL (ref 74–99)
HCT VFR BLD AUTO: 38.2 % (ref 36–46)
HGB BLD-MCNC: 13.3 G/DL (ref 12–16)
MCH RBC QN AUTO: 28.1 PG (ref 26–34)
MCHC RBC AUTO-ENTMCNC: 34.8 G/DL (ref 32–36)
MCV RBC AUTO: 81 FL (ref 80–100)
NRBC BLD-RTO: 0 /100 WBCS (ref 0–0)
PHOSPHATE SERPL-MCNC: 3.8 MG/DL (ref 2.5–4.9)
PLATELET # BLD AUTO: 313 X10*3/UL (ref 150–450)
POTASSIUM SERPL-SCNC: 4 MMOL/L (ref 3.5–5.3)
RBC # BLD AUTO: 4.74 X10*6/UL (ref 4–5.2)
SODIUM SERPL-SCNC: 128 MMOL/L (ref 136–145)
WBC # BLD AUTO: 7.7 X10*3/UL (ref 4.4–11.3)

## 2024-06-17 PROCEDURE — 2500000002 HC RX 250 W HCPCS SELF ADMINISTERED DRUGS (ALT 637 FOR MEDICARE OP, ALT 636 FOR OP/ED): Performed by: INTERNAL MEDICINE

## 2024-06-17 PROCEDURE — 99231 SBSQ HOSP IP/OBS SF/LOW 25: CPT | Performed by: INTERNAL MEDICINE

## 2024-06-17 PROCEDURE — 2500000001 HC RX 250 WO HCPCS SELF ADMINISTERED DRUGS (ALT 637 FOR MEDICARE OP): Performed by: INTERNAL MEDICINE

## 2024-06-17 PROCEDURE — 36415 COLL VENOUS BLD VENIPUNCTURE: CPT | Performed by: INTERNAL MEDICINE

## 2024-06-17 PROCEDURE — 80069 RENAL FUNCTION PANEL: CPT | Performed by: INTERNAL MEDICINE

## 2024-06-17 PROCEDURE — 85027 COMPLETE CBC AUTOMATED: CPT | Performed by: INTERNAL MEDICINE

## 2024-06-17 RX ORDER — DESMOPRESSIN ACETATE 0.1 MG/ML
1 SOLUTION NASAL NIGHTLY
Start: 2024-06-17

## 2024-06-17 RX ORDER — HYDROCORTISONE 20 MG/1
20 TABLET ORAL EVERY 24 HOURS
Start: 2024-06-18

## 2024-06-17 RX ORDER — HYDROCORTISONE 10 MG/1
10 TABLET ORAL EVERY 24 HOURS
Start: 2024-06-17

## 2024-06-17 RX ORDER — LEVOTHYROXINE SODIUM 150 UG/1
150 TABLET ORAL DAILY
Start: 2024-06-18

## 2024-06-17 RX ADMIN — LEVOTHYROXINE SODIUM 150 MCG: 75 TABLET ORAL at 05:13

## 2024-06-17 RX ADMIN — HYDROCORTISONE 20 MG: 5 TABLET ORAL at 09:11

## 2024-06-17 ASSESSMENT — ACTIVITIES OF DAILY LIVING (ADL): LACK_OF_TRANSPORTATION: NO

## 2024-06-17 ASSESSMENT — COGNITIVE AND FUNCTIONAL STATUS - GENERAL
MOBILITY SCORE: 24
DAILY ACTIVITIY SCORE: 24

## 2024-06-17 ASSESSMENT — PAIN SCALES - GENERAL
PAINLEVEL_OUTOF10: 0 - NO PAIN

## 2024-06-17 ASSESSMENT — PAIN - FUNCTIONAL ASSESSMENT
PAIN_FUNCTIONAL_ASSESSMENT: 0-10
PAIN_FUNCTIONAL_ASSESSMENT: 0-10

## 2024-06-17 NOTE — PROGRESS NOTES
06/17/24 1059   Discharge Planning   Living Arrangements Alone   Support Systems Parent;Family members   Assistance Needed Independent, drives, works- teacher   Type of Residence Private residence   Number of Stairs to Enter Residence 47   Number of Stairs Within Residence 0   Do you have animals or pets at home? No   Home or Post Acute Services None   Patient expects to be discharged to: [facility]   Does the patient need discharge transport arranged? Yes   RoundTrip coordination needed? Yes   Has discharge transport been arranged? No   Financial Resource Strain   How hard is it for you to pay for the very basics like food, housing, medical care, and heating? Not hard   Housing Stability   In the last 12 months, was there a time when you were not able to pay the mortgage or rent on time? N   In the last 12 months, how many places have you lived? 1   In the last 12 months, was there a time when you did not have a steady place to sleep or slept in a shelter (including now)? N   Transportation Needs   In the past 12 months, has lack of transportation kept you from medical appointments or from getting medications? no   In the past 12 months, has lack of transportation kept you from meetings, work, or from getting things needed for daily living? No   Patient Choice   Provider Choice list and CMS website (https://medicare.gov/care-compare#search) for post-acute Quality and Resource Measure Data were provided and reviewed with: Patient   Patient / Family choosing to utilize agency / facility established prior to hospitalization No        Met with patient at bedside and explained my role as care coordinator. She lives in an apartment, alone. She is independent with her care at home. She drives and works as a teacher. Patient denies use of any ambulatory devices. No oxygen in use at home, no HD. Her PCP is Dr. Tiffany Lemon and she seen her 3 months ago. Pharmacy she uses is in [place]. She is able to afford medications and  to get to her doctors appointments. Patient came with weakness and low sodium. This morning she is 128. Patient has active discharge order in. She is going home she denies any needs going home.

## 2024-06-17 NOTE — CARE PLAN
The patient's goals for the shift include Pt. will have a safe, restful and uneventful evening    The clinical goals for the shift include patient will be able to maintian hemodynamic stability by the end of the shift

## 2024-06-17 NOTE — PROGRESS NOTES
"Kerri Torres is a 39 y.o. female on day 2 of admission presenting with Hyponatremia.    Subjective   Feeling better     Scheduled medications  [Held by provider] desmopressin, 10 mcg, One Nostril, Nightly  hydrocortisone, 10 mg, oral, q24h  hydrocortisone, 20 mg, oral, q24h  levothyroxine, 150 mcg, oral, Daily  polyethylene glycol, 17 g, oral, Daily        Objective   Physical Exam  Neurological:      Mental Status: She is alert.         Last Recorded Vitals  Blood pressure 132/82, pulse 63, temperature 36.4 °C (97.6 °F), temperature source Temporal, resp. rate 20, height 1.753 m (5' 9\"), weight 88.9 kg (196 lb), last menstrual period 05/27/2024, SpO2 98%.  Intake/Output last 3 Shifts:  I/O last 3 completed shifts:  In: 480 (5.4 mL/kg) [P.O.:480]  Out: - (0 mL/kg)   Weight: 88.9 kg     Relevant Results   Latest Reference Range & Units 06/17/24 05:27   GLUCOSE 74 - 99 mg/dL 86   SODIUM 136 - 145 mmol/L 128 (L)   POTASSIUM 3.5 - 5.3 mmol/L 4.0   CHLORIDE 98 - 107 mmol/L 95 (L)   Bicarbonate 21 - 32 mmol/L 25   Anion Gap 10 - 20 mmol/L 12   Blood Urea Nitrogen 6 - 23 mg/dL 14   Creatinine 0.50 - 1.05 mg/dL 0.69   EGFR >60 mL/min/1.73m*2 >90   Calcium 8.6 - 10.3 mg/dL 9.3   PHOSPHORUS 2.5 - 4.9 mg/dL 3.8   Albumin 3.4 - 5.0 g/dL 4.3       Assessment/Plan   Principal Problem:    Hyponatremia    HYPOPITUITARISM  Symptoms improved  Na a little low again       RECOMMENDATIONS  Desmopressin and fluid management per nephrology consultant  Continue maintenance hydrocortisone, levothyroxine  Resume Genotropin at discharge  Xulane is continuous by patch      Marcel Romero MD    "

## 2024-06-17 NOTE — NURSING NOTE
Discharge instructions provided using teachback method.  Patient's health-related risk factors discussed with patient.  Patient educated to look for worsening signs and symptoms and educated to seek medical attention if experiencing medical emergency.  Patient aware of needs to follow up with outpatient clinics as scheduled. Home going meds reviewed with patient.  Patient verbalized understanding of disposition and discharge instructions.  All questions answered to patient's satisfaction and within nursing scope of practice.  Vitals stable; IV(s) removed by bedside RN.

## 2024-06-17 NOTE — CARE PLAN
Sodium is 128 today she came with 122, waiting for nephrology recommendation about discharge plans

## 2024-06-17 NOTE — CARE PLAN
The patient's goals for the shift include Pt. will have a safe, restful and uneventful evening    The clinical goals for the shift include Pt. will maintain adequate sodium levels this shift

## 2024-06-17 NOTE — DISCHARGE SUMMARY
Discharge Diagnosis  Hyponatremia  Diabetes insipidus,  Adrenal insufficiency.  Pituitary insufficiency.  Hypothyroidism.      Issues Requiring Follow-Up  PCP  Endocrinologist.      Test Results Pending At Discharge  Pending Labs       No current pending labs.            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, who is a schoolteacher, who has history of pituitary removal, since then she was diagnosed with diabetes insipidus, adrenal insufficiency, hypothyroidism, she usually takes hydrocortisone DDAVP levothyroxine, she started having nausea vomiting and diarrhea she came to the emergency department to get her sodium checked her sodium was 122 and the level was managed and then it went up to 128 and her meds were adjusted and seen by the nephrologist and endocrinologist discharged home on the following medication and she will continue basically her usual medications she will follow-up with the nephrologist and PCP as she needs to repeat the BMP in 2 to 3 days    Pertinent Physical Exam At Time of Discharge  Physical Exam  Alert 23.  HEENT unremarkable.  Neck no JVD.  Heart S1-S2.  Lungs reduced air entry.  Abdomen soft nontender.  Legs no edema.    Home Medications     Medication List      START taking these medications     desmopressin 10 mcg/spray (0.1 mL); Commonly known as: DDAVP; Administer   1 spray (10 mcg) into one nostril once daily at bedtime.   * hydrocortisone 10 mg tablet; Commonly known as: Cortef; Take 1 tablet   (10 mg) by mouth once every 24 hours.   * hydrocortisone 20 mg tablet; Commonly known as: Cortef; Take 1 tablet   (20 mg) by mouth once every 24 hours.; Start taking on: June 18, 2024   levothyroxine 150 mcg tablet; Commonly known as: Synthroid, Levoxyl;   Take 1 tablet (150 mcg) by mouth early in the morning.. Take on an empty   stomach at the same time each day, either 30 to 60 minutes prior to   breakfast; Start taking on: June 18, 2024  * This list has 2 medication(s) that are the  same as other medications   prescribed for you. Read the directions carefully, and ask your doctor or   other care provider to review them with you.       Outpatient Follow-Up  No future appointments.  Discharge timing more than 35 minutes  Vinny Pierce MD

## 2024-06-17 NOTE — CONSULTS
Reason For Consult  Hyponatremia    History Of Present Illness  Kerri Torres is a 39 y.o. female with a history of adrenal insufficiency, diabetes insipidus, has had a pituitary tumor removal, takes DDAVP nasal spray as well as hydrocortisone, levothyroxine.  She was suffering nausea, vomiting, diarrhea.  She thought her sodium may be low, came to the emergency department with sodium was 122 mEq/L.  She was given normal saline, sodium has come up too quickly.  She is followed by endocrinology at the [facility].  She has panhypopituitarism since resection of a craniopharyngioma at 7 years old, has been on the thyroid replacement, the Cortef, DDAVP at bedtime, somatropin and birth control pills for estrogen replacement.    No overnight events.  She feels at baseline.    Past Medical History  She has no past medical history on file.    Surgical History  She has no past surgical history on file.    Allergies  Shellfish containing products and Metoclopramide hcl      Current Facility-Administered Medications:     acetaminophen (Tylenol) tablet 650 mg, 650 mg, oral, q4h PRN, 650 mg at 06/16/24 2058 **OR** acetaminophen (Tylenol) oral liquid 650 mg, 650 mg, nasogastric tube, q4h PRN **OR** acetaminophen (Tylenol) suppository 650 mg, 650 mg, rectal, q4h PRN, Te Moreland DO    [Held by provider] desmopressin (DDAVP) 10 mcg/spray (0.1 mL) nasal spray 10 mcg PATIENT OWN MEDICATION, 10 mcg, One Nostril, Nightly, Tone Wang, Ay[place]    hydrocortisone (Cortef) tablet 10 mg, 10 mg, oral, q24h, Marcel Romero MD, 10 mg at 06/16/24 1705    hydrocortisone (Cortef) tablet 20 mg, 20 mg, oral, q24h, Marcel Romero MD, 20 mg at 06/17/24 0911    levothyroxine (Synthroid, Levoxyl) tablet 150 mcg, 150 mcg, oral, Daily, Vinny Pierce MD, 150 mcg at 06/17/24 0513    ondansetron (Zofran) injection 4 mg, 4 mg, intravenous, q6h PRN, Vinny Pierce MD    polyethylene glycol (Glycolax, Miralax) packet  17 g, 17 g, oral, Daily, Vinny Pierce MD    prochlorperazine (Compazine) tablet 10 mg, 10 mg, oral, q6h PRN **OR** prochlorperazine (Compazine) injection 10 mg, 10 mg, intravenous, q6h PRN **OR** prochlorperazine (Compazine) suppository 25 mg, 25 mg, rectal, q12h PRN, Vinny Pierce MD     Medications Discontinued During This Encounter   Medication Reason    desmopressin (DDAVP) 10 mcg/spray (0.1 mL) nasal spray 10 mcg     sodium chloride 0.9% infusion     desmopressin (DDAVP) 10 mcg/spray (0.1 mL) nasal spray 10 mcg Formulary change    hydrocortisone (Cortef) tablet 10 mg          Social History  She has no history on file for tobacco use, alcohol use, and drug use.    Family History  No family history on file.     Review of Systems   Constitutional: Negative.  Negative for diaphoresis, fatigue and fever.   HENT: Negative.  Negative for hearing loss, sore throat and tinnitus.    Eyes: Negative.  Negative for redness and visual disturbance.   Respiratory:  Negative for chest tightness and shortness of breath.    Cardiovascular:  Negative for chest pain, palpitations and leg swelling.   Gastrointestinal:  Negative for abdominal pain, blood in stool, diarrhea, nausea and vomiting.   Endocrine: Negative.    Genitourinary:  Negative for difficulty urinating, dysuria, frequency, hematuria and urgency.   Musculoskeletal:  Negative for arthralgias and joint swelling.   Skin:  Negative for rash.   Allergic/Immunologic: Negative.  Negative for immunocompromised state.   Neurological:  Negative for tremors, seizures, weakness and headaches.   Psychiatric/Behavioral:  Negative for behavioral problems, confusion and hallucinations.    All other systems reviewed and are negative.      Physical Exam  Constitutional:       Appearance: Normal appearance.   HENT:      Mouth/Throat:      Mouth: Mucous membranes are moist.   Eyes:      Extraocular Movements: Extraocular movements intact.      Pupils: Pupils are  "equal, round, and reactive to light.   Cardiovascular:      Rate and Rhythm: Regular rhythm.      Heart sounds: S1 normal and S2 normal.   Pulmonary:      Breath sounds: Normal breath sounds.   Abdominal:      Comments: Soft, NT/ND, no masses, normal bowel sounds   Genitourinary:     Comments: No hutton  Musculoskeletal:      Right lower leg: No edema.      Left lower leg: No edema.   Skin:     General: Skin is warm and dry.   Neurological:      General: No focal deficit present.      Mental Status: She is alert and oriented to person, place, and time.   Psychiatric:         Behavior: Behavior normal.      Last Recorded Vitals  Blood pressure 133/83, pulse 63, temperature 36.3 °C (97.3 °F), temperature source Temporal, resp. rate 18, height 1.753 m (5' 9\"), weight 88.9 kg (196 lb), last menstrual period 05/27/2024, SpO2 98%.    Last 24 hour lab Results  Results for orders placed or performed during the hospital encounter of 06/15/24 (from the past 24 hour(s))   Creatinine, Urine Random   Result Value Ref Range    Creatinine, Urine Random 72.0 20.0 - 320.0 mg/dL   Osmolality, urine   Result Value Ref Range    Osmolality, Urine Random 513 200 - 1,200 mOsm/kg   Renal function panel   Result Value Ref Range    Glucose 103 (H) 74 - 99 mg/dL    Sodium 131 (L) 136 - 145 mmol/L    Potassium 4.2 3.5 - 5.3 mmol/L    Chloride 99 98 - 107 mmol/L    Bicarbonate 25 21 - 32 mmol/L    Anion Gap 11 10 - 20 mmol/L    Urea Nitrogen 13 6 - 23 mg/dL    Creatinine 0.85 0.50 - 1.05 mg/dL    eGFR 90 >60 mL/min/1.73m*2    Calcium 8.6 8.6 - 10.3 mg/dL    Phosphorus 3.3 2.5 - 4.9 mg/dL    Albumin 4.1 3.4 - 5.0 g/dL   CBC   Result Value Ref Range    WBC 7.7 4.4 - 11.3 x10*3/uL    nRBC 0.0 0.0 - 0.0 /100 WBCs    RBC 4.74 4.00 - 5.20 x10*6/uL    Hemoglobin 13.3 12.0 - 16.0 g/dL    Hematocrit 38.2 36.0 - 46.0 %    MCV 81 80 - 100 fL    MCH 28.1 26.0 - 34.0 pg    MCHC 34.8 32.0 - 36.0 g/dL    RDW 11.7 11.5 - 14.5 %    Platelets 313 150 - 450 " x10*3/uL   Renal Function Panel   Result Value Ref Range    Glucose 86 74 - 99 mg/dL    Sodium 128 (L) 136 - 145 mmol/L    Potassium 4.0 3.5 - 5.3 mmol/L    Chloride 95 (L) 98 - 107 mmol/L    Bicarbonate 25 21 - 32 mmol/L    Anion Gap 12 10 - 20 mmol/L    Urea Nitrogen 14 6 - 23 mg/dL    Creatinine 0.69 0.50 - 1.05 mg/dL    eGFR >90 >60 mL/min/1.73m*2    Calcium 9.3 8.6 - 10.3 mg/dL    Phosphorus 3.8 2.5 - 4.9 mg/dL    Albumin 4.3 3.4 - 5.0 g/dL        Imaging results   No results found.      Assessment/Plan   Kerri Torres is a 39 y.o. female with a history of adrenal insufficiency, diabetes insipidus, has had a pituitary tumor removal, takes DDAVP nasal spray as well as hydrocortisone, levothyroxine.  She was suffering nausea, vomiting, diarrhea.  She thought her sodium may be low, came to the emergency department with sodium was 122 mEq/L.  She was given normal saline, sodium has come up too quickly.  She is followed by [facility]ocr[facility]ology at the [facility].  She has panhypopituitarism since resection of a craniopharyngioma at 7 years old, has been on the thyroid replacement, the Cortef, DDAVP at bedtime, somatropin and birth control pills for estrogen replacement.    She did not receive her DDAVP on Saturday night, we gave her 2 mcg of IV DDAVP yesterday, sodium came down to 131 mEq/L, 128 this morning for which I am pleased.  She tells me that she runs in this range chronically, I am not certain that it safe.  I sat with her and I explained the physiology, that she has no ADH, and that she can only bring the sodium down by using DDAVP and drinking water.  I asked her to look at her urine, to understand that concentrated urine would signify reabsorption of water and that the sodium is likely dropping.  She voices understanding.  She can be discharged from my perspective, I want her to use her 10 mcg of DDAVP nasally tonight, she will call her endocrinologist in [place], she will likely drive back home  tomorrow.       50 minutes in the care of Ms. Torres.    Ayaz Villalpando MD     Patient requests all Lab, Cardiology, and Radiology Results on their Discharge Instructions